# Patient Record
Sex: FEMALE | Employment: FULL TIME | URBAN - METROPOLITAN AREA
[De-identification: names, ages, dates, MRNs, and addresses within clinical notes are randomized per-mention and may not be internally consistent; named-entity substitution may affect disease eponyms.]

---

## 2019-02-06 ENCOUNTER — TELEPHONE (OUTPATIENT)
Dept: OTOLARYNGOLOGY | Facility: CLINIC | Age: 34
End: 2019-02-06

## 2019-02-06 NOTE — TELEPHONE ENCOUNTER
Left message for patient to call office. Need to rescheduled patients appt from 2/28/19 due to dr beckham being out the office.

## 2019-02-07 ENCOUNTER — TELEPHONE (OUTPATIENT)
Dept: OTOLARYNGOLOGY | Facility: CLINIC | Age: 34
End: 2019-02-07

## 2019-02-07 NOTE — TELEPHONE ENCOUNTER
----- Message from Nickie Vaca sent at 2/7/2019  8:12 AM CST -----  Contact: self - [phone]  Pt returned staff's call. Please contact back at earliest convenience.

## 2019-02-07 NOTE — TELEPHONE ENCOUNTER
Patient called me back and I rescheduled her appointment for March 1 due to Dr. Fan not being in office on February 28.

## 2019-02-18 ENCOUNTER — OFFICE VISIT (OUTPATIENT)
Dept: OTOLARYNGOLOGY | Facility: CLINIC | Age: 34
End: 2019-02-18
Payer: COMMERCIAL

## 2019-02-18 VITALS
BODY MASS INDEX: 23.36 KG/M2 | WEIGHT: 119 LBS | DIASTOLIC BLOOD PRESSURE: 60 MMHG | HEIGHT: 60 IN | SYSTOLIC BLOOD PRESSURE: 118 MMHG

## 2019-02-18 DIAGNOSIS — H61.23 BILATERAL IMPACTED CERUMEN: Primary | ICD-10-CM

## 2019-02-18 PROCEDURE — 99499 NO LOS: ICD-10-PCS | Mod: S$GLB,,, | Performed by: OTOLARYNGOLOGY

## 2019-02-18 PROCEDURE — 69210 REMOVE IMPACTED EAR WAX UNI: CPT | Mod: S$GLB,,, | Performed by: OTOLARYNGOLOGY

## 2019-02-18 PROCEDURE — 99499 UNLISTED E&M SERVICE: CPT | Mod: S$GLB,,, | Performed by: OTOLARYNGOLOGY

## 2019-02-18 PROCEDURE — 69210 PR REMOVAL IMPACTED CERUMEN REQUIRING INSTRUMENTATION, UNILATERAL: ICD-10-PCS | Mod: S$GLB,,, | Performed by: OTOLARYNGOLOGY

## 2019-02-18 NOTE — PROGRESS NOTES
History of Present Illness:  Francesca Humphreys presents to the clinic today for Cerumen Impaction  .  She is a 33-year-old female who has been a patient here since 08/01/2016.  Her only problem is that she has small ear canals and is here today to have cerumen removed from her ears.  She has no pain or drainage or other symptoms.  She has no hearing loss.    History reviewed. No pertinent past medical history.  Past Surgical History:   Procedure Laterality Date    AUGMENTATION MAMMAPLASTY       Family History   Problem Relation Age of Onset    Breast cancer Neg Hx     Colon cancer Neg Hx     Ovarian cancer Neg Hx        Social History     Tobacco Use    Smoking status: Never Smoker    Smokeless tobacco: Never Used   Substance Use Topics    Alcohol use: Not on file    Drug use: Not on file           REVIEW OF SYSTEMS:    General -no  fevers, chills, night sweats, and weight loss     Ears     - no  tinnitus and chronic hearing loss.               - no  infection, pressure, drainage, congestion                 Nose    she has no nasal complaints    Throat    - no  throat pain      - no  hoarseness    Neck    - no  neck mass    Eyes       - no  recent changes in vision or history of glaucoma     Cardiovascular -  no  hypertension      no  history of cardiovascular disease    Endocrine  - no  diabetes        - no  thyroid problems      Gastrointestinal        - no  gastrointestinal chronic disease        Genitourinary -       - no  kidney or bladder problems      - no  CKD      Heme/Lymph       - no  bleeding disorder      - no  anemia       - no  adenopathy      Musculoskeletal     - no  arthritis     -  no unusual muscle weakness    Neuro     -no   unexplained weakness or slurred speech      - no  focal neurologic symptoms       - no  seizures    Psych    -no chronic psychiatric illness    Respiratory -   - no  chronic cough or shortness of breath   - no  asthma   - no  COPD      Physical Exam:    Gen    - she is   well-nourished well-developed, and in no acute distress.   Voice - clear, speech intelligible   Head  - normocephalic without evidence of trauma, face symmetric with good tone   Salivary glands             - Parotid glands : no asymmetry, no lesions or masses    - Submaxillary (submandibular) glands: no asymmetry, no lesions or masses  Skin   - no rashes or lesions of the skin of the head and neck   Ears   - both tympanic membranes, external canals, and auricles are normal with the exception that there was impacted cerumen in both ears.  I removed it with irrigation, alligator forceps, and suction.               Hearing is grossly normal.   Nose  - there is no external deformity. There is no rhinorrhea. Septum is midline.               The inferior turbinates are unremarkable. The mucosa is healthy.              No polyps were noted.  Oral cavity    - there are no lesions of the lips, nor of the buccal, lingual, gingival, or               palatal mucosal surfaces. The dentition is adequate.   Oropharynx   - The posterior oropharyngeal wall is clear.               The base of tongue has no lesions.    Neck  - palpation of the neck reveals no lymphadenopathy or masses.               The thyroid is unremarkable. There are no incisions.               No supraclavicular lymphadenopathy.        Assessment:   Francesca was seen today for cerumen impaction.    Diagnoses and all orders for this visit:    Bilateral impacted cerumen      Plan:   I removed cerumen with irrigation, alligator forceps, and suction. She had no other symptoms or complaints.  Return annually for ear cleaning.    No Follow-up on file.